# Patient Record
Sex: FEMALE | Race: WHITE | NOT HISPANIC OR LATINO | Employment: UNEMPLOYED | URBAN - METROPOLITAN AREA
[De-identification: names, ages, dates, MRNs, and addresses within clinical notes are randomized per-mention and may not be internally consistent; named-entity substitution may affect disease eponyms.]

---

## 2017-08-17 ENCOUNTER — PATIENT OUTREACH (OUTPATIENT)
Dept: ADMINISTRATIVE | Facility: HOSPITAL | Age: 52
End: 2017-08-17

## 2017-08-17 NOTE — LETTER
August 17, 2017    Abbey Qureshi  [address]  [facility] [address]             [facility]  [address]  [facility] [address]  Phone: [phone]    Dear Abbey Qureshi     In the spirit of maintaining your good health, our system indicates that you have not been seen in the office in over 12 months and due for a visit.     Our system indicates that you are due for the following:   Health Maintenance Due   Topic Date Due    Hepatitis C Screening  1965    TETANUS VACCINE  03/15/1983    Foot Exam  04/07/2015    Eye Exam  03/17/2016    Hemoglobin A1c  12/22/2016    Mammogram  03/21/2017    Lipid Panel  06/22/2017    Influenza Vaccine  08/01/2017       Myriam Solis MD would like you to schedule an appointment for an annual exam at your earliest convenience. If you have completed any of these health maintenance requirements at an outside facility, please contact our office so we may update your health record.    If your PRIMARY CARE PHYSICIAN has changed please contact your insurance company to reflect the correct physician.    If you have any issues or need assistance in scheduling this appointment, please call the number below.     Radha HOFF LPN  Care Coordination Department  [facility]  [phone]

## 2017-08-17 NOTE — PROGRESS NOTES
Attempted to contact patient to inquire if Dr. Solis is still her pcp. No answer and no voice mail. Letter mailed to patient.